# Patient Record
Sex: FEMALE | Race: WHITE | NOT HISPANIC OR LATINO | URBAN - METROPOLITAN AREA
[De-identification: names, ages, dates, MRNs, and addresses within clinical notes are randomized per-mention and may not be internally consistent; named-entity substitution may affect disease eponyms.]

---

## 2024-10-02 ENCOUNTER — OFFICE VISIT (OUTPATIENT)
Dept: URGENT CARE | Age: 54
End: 2024-10-02
Payer: COMMERCIAL

## 2024-10-02 ENCOUNTER — ANCILLARY PROCEDURE (OUTPATIENT)
Dept: URGENT CARE | Age: 54
End: 2024-10-02
Payer: COMMERCIAL

## 2024-10-02 VITALS
BODY MASS INDEX: 30.53 KG/M2 | RESPIRATION RATE: 16 BRPM | WEIGHT: 190 LBS | TEMPERATURE: 97.5 F | SYSTOLIC BLOOD PRESSURE: 104 MMHG | HEIGHT: 66 IN | DIASTOLIC BLOOD PRESSURE: 69 MMHG | HEART RATE: 75 BPM | OXYGEN SATURATION: 98 %

## 2024-10-02 DIAGNOSIS — S99.922A INJURY OF GREAT TOE OF LEFT FOOT, INITIAL ENCOUNTER: ICD-10-CM

## 2024-10-02 DIAGNOSIS — U07.1 COVID: ICD-10-CM

## 2024-10-02 DIAGNOSIS — Z20.822 EXPOSURE TO CONFIRMED CASE OF COVID-19: ICD-10-CM

## 2024-10-02 DIAGNOSIS — S99.922A INJURY OF GREAT TOE OF LEFT FOOT, INITIAL ENCOUNTER: Primary | ICD-10-CM

## 2024-10-02 DIAGNOSIS — J02.9 SORE THROAT: ICD-10-CM

## 2024-10-02 LAB
POC RAPID STREP: NEGATIVE
POC SARS-COV-2 AG BINAX: ABNORMAL

## 2024-10-02 ASSESSMENT — ENCOUNTER SYMPTOMS
COUGH: 1
CHILLS: 1
ARTHRALGIAS: 1
CARDIOVASCULAR NEGATIVE: 1
FATIGUE: 1
SORE THROAT: 1
RHINORRHEA: 1
JOINT SWELLING: 1

## 2024-10-02 NOTE — PATIENT INSTRUCTIONS
Rest affected extremity.  Ice injured area 20 min on, 20 off and repeat. Elevate.     Do the above for the next 1-2 days.     Ibuprofen and/or Tylenol for pain and inflammation.     Please follow up with PCP or Ortho if symptoms persist      Ortho [phone]      Consider taking Mucinex for congestion. Make sure to drink plenty of fluids while taking this medication as it increases its effectiveness.     Consider Zyrtec or Claritin    Consider Flonase Nasal Spray    Consider taking Sudafed to help decrease any congestion. If no history of high blood pressure.  Consider Corcedin BP for high blood pressure.    Use throat lozenges, buckwheat honey, gargling salt water to help relieve sore throat symptoms.     Recommend inhaling steam from a hot shower or hot bath as well as using saline sinus rinse for congestion. Recommend Armand Med sinus rinse. Make sure to use bottled or distilled water.

## 2024-10-02 NOTE — PROGRESS NOTES
"Subjective   Patient ID: Sandy Randolph is a 54 y.o. female. They present today with a chief complaint of Toe Pain (Yesterday - left big toe ).    History of Present Illness  54-year-old female presents to the clinic with multiple complaints.  Patient's first, left great toe injury.  Patient states that she caught her toe and it flexed causing a snap.  Patient states it has become bruised and swollen and painful to walk on.  Denies any radiation of the pain.  Patient states she has minor numbness or tingling.    Patient states that she is also nasal congestion, sore throat, cough and minor aches over the last couple days.  She was recently in the hospital visiting her mother.  Denies chest pain or shortness of breath.      Toe Pain  Associated symptoms: congestion, cough, fatigue, rhinorrhea and sore throat        Past Medical History  Allergies as of 10/02/2024 - Reviewed 10/02/2024   Allergen Reaction Noted    Benzocaine Anaphylaxis 04/01/2014    Gabapentin Palpitations and Shortness of breath 01/18/2019    Latex Hives and Itching 10/27/2014       (Not in a hospital admission)       History reviewed. No pertinent past medical history.    History reviewed. No pertinent surgical history.     reports that she has never smoked. She has never used smokeless tobacco. Alcohol use questions deferred to the physician. Drug use questions deferred to the physician.    Review of Systems  Review of Systems   Constitutional:  Positive for chills and fatigue.   HENT:  Positive for congestion, rhinorrhea and sore throat.    Respiratory:  Positive for cough.    Cardiovascular: Negative.    Musculoskeletal:  Positive for arthralgias and joint swelling.                                  Objective    Vitals:    10/02/24 1657   BP: 104/69   Pulse: 75   Resp: 16   Temp: 36.4 °C (97.5 °F)   SpO2: 98%   Weight: 86.2 kg (190 lb)   Height: 1.676 m (5' 6\")     No LMP recorded. Patient is postmenopausal.    Physical Exam  Constitutional:     "   Appearance: Normal appearance.   HENT:      Mouth/Throat:      Mouth: Mucous membranes are moist.      Pharynx: Oropharynx is clear. No oropharyngeal exudate or posterior oropharyngeal erythema.   Cardiovascular:      Rate and Rhythm: Normal rate and regular rhythm.   Pulmonary:      Effort: Pulmonary effort is normal.      Breath sounds: Normal breath sounds.   Musculoskeletal:      Comments: Diffuse swelling and ecchymosis to the left great toe, tenderness over proximal interphalangeal joint   Neurological:      Mental Status: She is alert.         Procedures    Point of Care Test & Imaging Results from this visit  Results for orders placed or performed in visit on 10/02/24   POCT Covid-19 Rapid Antigen   Result Value Ref Range    POC QASIM-COV-2 AG Positive test for SARS-CoV-2 (antigen detected) (A) Presumptive negative test for SARS-CoV-2 (no antigen detected)   POCT rapid strep A manually resulted   Result Value Ref Range    POC Rapid Strep Negative Negative      XR foot left 3+ views    Result Date: 10/2/2024  Interpreted By:  Lupillo Mayers, STUDY: XR FOOT LEFT 3+ VIEWS; ;  10/2/2024 5:25 pm   INDICATION: Signs/Symptoms:stubbed toe.   ,S99.922A Unspecified injury of left foot, initial encounter   COMPARISON: None.   ACCESSION NUMBER(S): [id number]   ORDERING CLINICIAN: BEHZAD ARMENTA   FINDINGS: There are degenerative changes noted. No acute fracture or dislocation. There is an inferior calcaneal spur noted.       No acute fracture or dislocation.     MACRO: None   Signed by: Lupillo Mayers 10/2/2024 5:43 PM Dictation workstation:   [id number]     Diagnostic study results (if any) were reviewed by Behzad Armenta PA-C.    Assessment/Plan   Allergies, medications, history, and pertinent labs/EKGs/Imaging reviewed by Behzad Armenta PA-C.     Medical Decision Making  Vital signs stable.  Cardiopulmonary exam within normal limits.  Rapid COVID-positive.X-rays are obtained of the left great toe and negative  for any acute bony pathology.  Patient was given a postop.  I advised her to elevate ice as well as using Tylenol ibuprofen.  If symptoms do not improve follow-up with Ortho.    Orders and Diagnoses  Diagnoses and all orders for this visit:  Injury of great toe of left foot, initial encounter  -     XR foot left 3+ views; Future  -     Referral to Orthopaedic Surgery; Future  -     Post-op shoe  COVID  Exposure to confirmed case of COVID-19  -     POCT Covid-19 Rapid Antigen  Sore throat  -     POCT rapid strep A manually resulted      Medical Admin Record      Patient disposition: Home    Electronically signed by Stephen Armenta PA-C  6:37 PM